# Patient Record
Sex: MALE | Race: ASIAN | NOT HISPANIC OR LATINO
[De-identification: names, ages, dates, MRNs, and addresses within clinical notes are randomized per-mention and may not be internally consistent; named-entity substitution may affect disease eponyms.]

---

## 2024-05-13 PROBLEM — Z00.00 ENCOUNTER FOR PREVENTIVE HEALTH EXAMINATION: Status: ACTIVE | Noted: 2024-05-13

## 2024-05-16 ENCOUNTER — APPOINTMENT (OUTPATIENT)
Dept: THORACIC SURGERY | Facility: CLINIC | Age: 59
End: 2024-05-16
Payer: MEDICAID

## 2024-05-16 VITALS
WEIGHT: 163 LBS | HEART RATE: 98 BPM | OXYGEN SATURATION: 96 % | TEMPERATURE: 98.3 F | HEIGHT: 67.72 IN | RESPIRATION RATE: 18 BRPM | DIASTOLIC BLOOD PRESSURE: 76 MMHG | SYSTOLIC BLOOD PRESSURE: 117 MMHG | BODY MASS INDEX: 24.99 KG/M2

## 2024-05-16 DIAGNOSIS — E13.69 OTHER SPECIFIED DIABETES MELLITUS WITH OTHER SPECIFIED COMPLICATION: ICD-10-CM

## 2024-05-16 DIAGNOSIS — R59.1 GENERALIZED ENLARGED LYMPH NODES: ICD-10-CM

## 2024-05-16 DIAGNOSIS — Z78.9 OTHER SPECIFIED HEALTH STATUS: ICD-10-CM

## 2024-05-16 DIAGNOSIS — J18.1 LOBAR PNEUMONIA, UNSPECIFIED ORGANISM: ICD-10-CM

## 2024-05-16 DIAGNOSIS — Z86.39 PERSONAL HISTORY OF OTHER ENDOCRINE, NUTRITIONAL AND METABOLIC DISEASE: ICD-10-CM

## 2024-05-16 DIAGNOSIS — F17.200 NICOTINE DEPENDENCE, UNSPECIFIED, UNCOMPLICATED: ICD-10-CM

## 2024-05-16 PROCEDURE — 99245 OFF/OP CONSLTJ NEW/EST HI 55: CPT

## 2024-05-20 PROBLEM — R59.1 LYMPHADENOPATHY: Status: ACTIVE | Noted: 2024-05-20

## 2024-05-20 NOTE — ASSESSMENT
[FreeTextEntry1] : Mr. RAJIV BARKER, 58 year old male, current smoker, w/ hx of DM, HLD, who c/o cough, sent for CT chest which showed lung consolidation, patient was given ~ 2 course of ABX, also developed hemoptysis for 10 days, referred by PCP Dr. Carter Green.  CT Chest w/ contrast on 4/5/24 at [facility]: - a large area of consolidation in the right lower lobe measuring up to 10 cm. - There is a more focal 2.6 cm masslike density in the medial aspect of the right lower lobe series 2 image 58. A close follow up CT is recommended in 3-4 weeks after treatment to assess for resolution and exclude the possibility of malignancy - There are mild emphysematous changes. - No evidence of pleural effusion or pleural-based mass.  CT Chest w/ contrast on 5/5/24 at [facility]: - The lungs demonstrate a right lower lobe pulmonary nodular opacity measuring up to 2.9 x 2.6 cm in maximal dimension on axial imaging (series 3, image 191) that has not significantly changed since April 5, 2024; please note that there is increasing associated right basilar pulmonary opacification (series 3, image 240).  - There is persistent minimal pulmonary emphysema with an upper lung distribution.  - endoluminal secretions within the central airways, including with occlusion of parts of the right lower lobe bronchial system (series 3, image 184).  - There is diffuse bronchial and bronchiolar wall thickening.  - There is no pleural effusion or pneumothorax on either side - diffuse fatty infiltration of the imaged liver (hepatic steatosis) in addition to a 2 mm hepatic calcification that may be related to prior granulomatous disease (series 2, image 88).  I have reviewed the patient's medical records and diagnostic images at time of this office consultation and have made the following recommendation: 1. CT chest reviewed and compared, not significantly changed after treated with ABX. We discussed biopsy for diagnostic purposes. Will schedule for Flexible Bronchoscopy, Endobronchial ultrasound biopsy on 06/06/2024. The risks benefits and alternatives of the procedure were explained to the patient. All of his questions were answered, and patient freely consented to the procedure.  2. Medical clearance and PST. 3. PET/CT 4. Smoking cessation.   *****Patient did not provide medication list****  I, Dr. MARLYN, MARYBETH CHINFAI, personally performed the evaluation and management (E/M) services for this new patient.  That E/M includes conducting the initial examination, assessing all conditions, and establishing the plan of care.  Today, my ACP, Sveta Vciente ANP-C, was here to observe my evaluation and management services for this patient to be followed going forward.

## 2024-05-20 NOTE — DATA REVIEWED
[FreeTextEntry1] : I have independently reviewed the following: CT Chest w/ contrast on 4/5/24 at [facility] CT Chest w/ contrast on 5/5/24 at [facility]

## 2024-05-20 NOTE — HISTORY OF PRESENT ILLNESS
[FreeTextEntry1] : Mr. RAJIV BARKER, 58 year old male, current smoker, w/ hx of DM, HLD, who c/o cough, sent for CT chest which showed lung consolidation, patient was given ~ 2 course of ABX, also developed hemoptysis for 10 days, referred by PCP Dr. Carter Green.  CT Chest w/ contrast on 4/5/24 at [facility]: - a large area of consolidation in the right lower lobe measuring up to 10 cm. - There is a more focal 2.6 cm masslike density in the medial aspect of the right lower lobe series 2 image 58. A close follow up CT is recommended in 3-4 weeks after treatment to assess for resolution and exclude the possibility of malignancy - There are mild emphysematous changes. - No evidence of pleural effusion or pleural-based mass.  CT Chest w/ contrast on 5/5/24 at [facility]: - The lungs demonstrate a right lower lobe pulmonary nodular opacity measuring up to 2.9 x 2.6 cm in maximal dimension on axial imaging (series 3, image 191) that has not significantly changed since April 5, 2024; please note that there is increasing associated right basilar pulmonary opacification (series 3, image 240).  - There is persistent minimal pulmonary emphysema with an upper lung distribution.  - endoluminal secretions within the central airways, including with occlusion of parts of the right lower lobe bronchial system (series 3, image 184).  - There is diffuse bronchial and bronchiolar wall thickening.  - There is no pleural effusion or pneumothorax on either side - diffuse fatty infiltration of the imaged liver (hepatic steatosis) in addition to a 2 mm hepatic calcification that may be related to prior granulomatous disease (series 2, image 88).  Patient is here today for CT Sx consultation. Patient c/o SOB, chills, cough with whitish sputum, no hemoptysis now. ~ 25 lbs weight loss since 10/2023. Patient denies chest pain, fever, chills, loss of appetite, or hemoptysis.

## 2024-05-20 NOTE — CONSULT LETTER
[Dear  ___] : Dear  [unfilled], [Consult Letter:] : I had the pleasure of evaluating your patient, [unfilled]. [( Thank you for referring [unfilled] for consultation for _____ )] : Thank you for referring [unfilled] for consultation for [unfilled] [Please see my note below.] : Please see my note below. [Consult Closing:] : Thank you very much for allowing me to participate in the care of this patient.  If you have any questions, please do not hesitate to contact me. [Sincerely,] : Sincerely, [FreeTextEntry2] : Dr. Carter Green (PCP/Referring) [FreeTextEntry3] : Herberth Darling MD, MPH  System Director of Thoracic Surgery  Director of Comprehensive Lung and Foregut [place]  Professor Cardiovascular & Thoracic Surgery   [facility] [employer] [facility] [address] [place] [address] [place] [address] Tel: [phone] Fax: [phone]

## 2024-05-31 ENCOUNTER — OUTPATIENT (OUTPATIENT)
Dept: OUTPATIENT SERVICES | Facility: HOSPITAL | Age: 59
LOS: 1 days | End: 2024-05-31

## 2024-05-31 VITALS
TEMPERATURE: 98 F | OXYGEN SATURATION: 100 % | WEIGHT: 149.91 LBS | HEART RATE: 87 BPM | SYSTOLIC BLOOD PRESSURE: 124 MMHG | RESPIRATION RATE: 16 BRPM | DIASTOLIC BLOOD PRESSURE: 77 MMHG | HEIGHT: 69 IN

## 2024-05-31 DIAGNOSIS — I10 ESSENTIAL (PRIMARY) HYPERTENSION: ICD-10-CM

## 2024-05-31 DIAGNOSIS — E11.9 TYPE 2 DIABETES MELLITUS WITHOUT COMPLICATIONS: ICD-10-CM

## 2024-05-31 DIAGNOSIS — J18.1 LOBAR PNEUMONIA, UNSPECIFIED ORGANISM: ICD-10-CM

## 2024-05-31 DIAGNOSIS — Z98.890 OTHER SPECIFIED POSTPROCEDURAL STATES: Chronic | ICD-10-CM

## 2024-05-31 RX ORDER — DEXTROSE MONOHYDRATE AND SODIUM CHLORIDE 5; .3 G/100ML; G/100ML
1000 INJECTION, SOLUTION INTRAVENOUS
Refills: 0 | Status: DISCONTINUED | OUTPATIENT
Start: 2024-06-20 | End: 2024-07-04

## 2024-05-31 NOTE — H&P PST ADULT - PROBLEM SELECTOR PLAN 1
pt scheduled for flexible bronchoscopy endobronchial ultrasound guided biopsy **cytology needed***  Preop instructions provided. Pt verbalized understanding.   Pepcid for GI prophylaxis with written and verbal instruction provided  recent labs from PCP requested

## 2024-05-31 NOTE — H&P PST ADULT - PROBLEM SELECTOR PLAN 2
pt instructed to continue medications as prescribed and take cozaar with a sip of water on the morning of the surgery

## 2024-05-31 NOTE — H&P PST ADULT - PROBLEM SELECTOR PLAN 3
Patient instructed to hold actos, januvia, metformin on the morning of procedure. Pt stated understanding.

## 2024-05-31 NOTE — H&P PST ADULT - NSANTHOSAYNRD_GEN_A_CORE
No. VERONIKA screening performed.  STOP BANG Legend: 0-2 = LOW Risk; 3-4 = INTERMEDIATE Risk; 5-8 = HIGH Risk

## 2024-05-31 NOTE — H&P PST ADULT - NSICDXPASTMEDICALHX_GEN_ALL_CORE_FT
PAST MEDICAL HISTORY:  DM (diabetes mellitus)     HLD (hyperlipidemia)     HTN (hypertension)     Kidney stone     Lobar pneumonia, unspecified organism

## 2024-05-31 NOTE — H&P PST ADULT - HISTORY OF PRESENT ILLNESS
59 y.o. male with h/o HTN, HLD, DM, presents to [facility] for evaluation scheduled for flexible bronchoscopy endobronchial ultrasound guided biopsy ***cytology needed***, preop diagnosis lobar pneumonia unspecified organism, c/o productive cough x 6 months with yellowish sputum, treated with abx, reports hemoptysis 1 month ago, weight loss x 25 lbs since 10/2023, denies further episodes of hemoptysis since, denies SOB, fever, chills, s/p CT chest 04/2024 and 05/2025, right lower lobe opacity noted

## 2024-05-31 NOTE — H&P PST ADULT - LAST ECHOCARDIOGRAM
05/2024, "in [place]", pt unable to provide further information, states was referred by PCP for the test 05/2024, "in [place]", [phone], pt unable to provide further information

## 2024-06-19 ENCOUNTER — TRANSCRIPTION ENCOUNTER (OUTPATIENT)
Age: 59
End: 2024-06-19

## 2024-06-20 ENCOUNTER — RESULT REVIEW (OUTPATIENT)
Age: 59
End: 2024-06-20

## 2024-06-20 ENCOUNTER — OUTPATIENT (OUTPATIENT)
Dept: OUTPATIENT SERVICES | Facility: HOSPITAL | Age: 59
LOS: 1 days | Discharge: HOME | End: 2024-06-20
Payer: MEDICAID

## 2024-06-20 ENCOUNTER — TRANSCRIPTION ENCOUNTER (OUTPATIENT)
Age: 59
End: 2024-06-20

## 2024-06-20 ENCOUNTER — APPOINTMENT (OUTPATIENT)
Dept: THORACIC SURGERY | Facility: HOSPITAL | Age: 59
End: 2024-06-20

## 2024-06-20 VITALS
RESPIRATION RATE: 16 BRPM | OXYGEN SATURATION: 100 % | SYSTOLIC BLOOD PRESSURE: 108 MMHG | HEART RATE: 67 BPM | TEMPERATURE: 98 F | DIASTOLIC BLOOD PRESSURE: 65 MMHG

## 2024-06-20 VITALS
OXYGEN SATURATION: 100 % | HEIGHT: 69 IN | RESPIRATION RATE: 16 BRPM | HEART RATE: 63 BPM | SYSTOLIC BLOOD PRESSURE: 117 MMHG | TEMPERATURE: 98 F | DIASTOLIC BLOOD PRESSURE: 70 MMHG | WEIGHT: 149.91 LBS

## 2024-06-20 DIAGNOSIS — Z98.890 OTHER SPECIFIED POSTPROCEDURAL STATES: Chronic | ICD-10-CM

## 2024-06-20 DIAGNOSIS — J18.1 LOBAR PNEUMONIA, UNSPECIFIED ORGANISM: ICD-10-CM

## 2024-06-20 LAB
GLUCOSE BLDC GLUCOMTR-MCNC: 143 MG/DL — HIGH (ref 70–99)
GLUCOSE BLDC GLUCOMTR-MCNC: 162 MG/DL — HIGH (ref 70–99)

## 2024-06-20 PROCEDURE — 88112 CYTOPATH CELL ENHANCE TECH: CPT | Mod: 26,59

## 2024-06-20 PROCEDURE — 31633 BRONCHOSCOPY/NEEDLE BX ADDL: CPT

## 2024-06-20 PROCEDURE — 88173 CYTOPATH EVAL FNA REPORT: CPT | Mod: 26

## 2024-06-20 PROCEDURE — 88342 IMHCHEM/IMCYTCHM 1ST ANTB: CPT | Mod: 26

## 2024-06-20 PROCEDURE — 31629 BRONCHOSCOPY/NEEDLE BX EACH: CPT

## 2024-06-20 PROCEDURE — 88305 TISSUE EXAM BY PATHOLOGIST: CPT | Mod: 26

## 2024-06-20 PROCEDURE — 31624 DX BRONCHOSCOPE/LAVAGE: CPT

## 2024-06-20 PROCEDURE — 31652 BRONCH EBUS SAMPLNG 1/2 NODE: CPT

## 2024-06-20 PROCEDURE — 88341 IMHCHEM/IMCYTCHM EA ADD ANTB: CPT | Mod: 26

## 2024-06-20 PROCEDURE — 88172 CYTP DX EVAL FNA 1ST EA SITE: CPT | Mod: 26,59

## 2024-06-20 PROCEDURE — 88333 PATH CONSLTJ SURG CYTO XM 1: CPT | Mod: 26,59

## 2024-06-20 PROCEDURE — 31625 BRONCHOSCOPY W/BIOPSY(S): CPT | Mod: 59

## 2024-06-20 PROCEDURE — 31645 BRNCHSC W/THER ASPIR 1ST: CPT

## 2024-06-20 PROCEDURE — 71045 X-RAY EXAM CHEST 1 VIEW: CPT | Mod: 26

## 2024-06-20 PROCEDURE — 88334 PATH CONSLTJ SURG CYTO XM EA: CPT | Mod: 26,59

## 2024-06-20 RX ORDER — FENTANYL CITRATE 50 UG/ML
25 INJECTION, SOLUTION INTRAMUSCULAR; INTRAVENOUS
Refills: 0 | Status: DISCONTINUED | OUTPATIENT
Start: 2024-06-20 | End: 2024-06-20

## 2024-06-20 RX ORDER — SITAGLIPTIN 50 MG/1
1 TABLET, FILM COATED ORAL
Refills: 0 | DISCHARGE

## 2024-06-20 RX ORDER — PYRIDOXINE HCL (VITAMIN B6) 100 MG
0 TABLET ORAL
Refills: 0 | DISCHARGE

## 2024-06-20 RX ORDER — ACETAMINOPHEN 325 MG
1000 TABLET ORAL ONCE
Refills: 0 | Status: DISCONTINUED | OUTPATIENT
Start: 2024-06-20 | End: 2024-07-04

## 2024-06-20 RX ORDER — FOLIC ACID 0.8 MG
1 TABLET ORAL
Refills: 0 | DISCHARGE

## 2024-06-20 RX ORDER — PIOGLITAZONE HYDROCHLORIDE 15 MG/1
1 TABLET ORAL
Refills: 0 | DISCHARGE

## 2024-06-20 RX ORDER — LOSARTAN POTASSIUM 100 MG/1
1 TABLET, FILM COATED ORAL
Refills: 0 | DISCHARGE

## 2024-06-20 RX ORDER — ONDANSETRON HYDROCHLORIDE 2 MG/ML
4 INJECTION INTRAMUSCULAR; INTRAVENOUS ONCE
Refills: 0 | Status: DISCONTINUED | OUTPATIENT
Start: 2024-06-20 | End: 2024-07-04

## 2024-06-20 RX ORDER — ATORVASTATIN CALCIUM 80 MG/1
1 TABLET, FILM COATED ORAL
Refills: 0 | DISCHARGE

## 2024-06-20 RX ORDER — ERGOCALCIFEROL 1.25 MG/1
0 CAPSULE ORAL
Refills: 0 | DISCHARGE

## 2024-06-20 RX ORDER — METFORMIN HYDROCHLORIDE 850 MG/1
1 TABLET ORAL
Refills: 0 | DISCHARGE

## 2024-06-20 RX ORDER — ALBUTEROL 90 MCG
2.5 AEROSOL REFILL (GRAM) INHALATION ONCE
Refills: 0 | Status: DISCONTINUED | OUTPATIENT
Start: 2024-06-20 | End: 2024-07-04

## 2024-06-20 RX ORDER — PREGABALIN 225 MG/1
0 CAPSULE ORAL
Refills: 0 | DISCHARGE

## 2024-06-25 PROBLEM — E78.5 HYPERLIPIDEMIA, UNSPECIFIED: Chronic | Status: ACTIVE | Noted: 2024-05-31

## 2024-06-25 PROBLEM — N20.0 CALCULUS OF KIDNEY: Chronic | Status: ACTIVE | Noted: 2024-05-31

## 2024-06-25 PROBLEM — I10 ESSENTIAL (PRIMARY) HYPERTENSION: Chronic | Status: ACTIVE | Noted: 2024-05-31

## 2024-06-25 PROBLEM — E11.9 TYPE 2 DIABETES MELLITUS WITHOUT COMPLICATIONS: Chronic | Status: ACTIVE | Noted: 2024-05-31

## 2024-06-25 PROBLEM — J18.1 LOBAR PNEUMONIA, UNSPECIFIED ORGANISM: Chronic | Status: ACTIVE | Noted: 2024-05-31

## 2024-07-04 LAB — NON-GYNECOLOGICAL CYTOLOGY STUDY: SIGNIFICANT CHANGE UP

## 2024-07-09 PROBLEM — C77.9 METASTATIC SQUAMOUS CELL CARCINOMA TO LYMPH NODE: Status: ACTIVE | Noted: 2024-07-08

## 2024-07-11 ENCOUNTER — NON-APPOINTMENT (OUTPATIENT)
Age: 59
End: 2024-07-11

## 2024-07-11 ENCOUNTER — APPOINTMENT (OUTPATIENT)
Dept: THORACIC SURGERY | Facility: CLINIC | Age: 59
End: 2024-07-11
Payer: MEDICAID

## 2024-07-11 VITALS
DIASTOLIC BLOOD PRESSURE: 61 MMHG | RESPIRATION RATE: 18 BRPM | TEMPERATURE: 98.1 F | SYSTOLIC BLOOD PRESSURE: 124 MMHG | OXYGEN SATURATION: 95 % | WEIGHT: 163 LBS | HEART RATE: 63 BPM | BODY MASS INDEX: 24.99 KG/M2

## 2024-07-11 DIAGNOSIS — C77.9 SECONDARY AND UNSPECIFIED MALIGNANT NEOPLASM OF LYMPH NODE, UNSPECIFIED: ICD-10-CM

## 2024-07-11 PROCEDURE — 99213 OFFICE O/P EST LOW 20 MIN: CPT

## 2024-10-10 ENCOUNTER — APPOINTMENT (OUTPATIENT)
Dept: THORACIC SURGERY | Facility: CLINIC | Age: 59
End: 2024-10-10
Payer: MEDICAID

## 2024-10-10 VITALS
DIASTOLIC BLOOD PRESSURE: 75 MMHG | TEMPERATURE: 98.1 F | RESPIRATION RATE: 18 BRPM | WEIGHT: 165 LBS | OXYGEN SATURATION: 100 % | SYSTOLIC BLOOD PRESSURE: 118 MMHG | BODY MASS INDEX: 25.3 KG/M2 | HEART RATE: 78 BPM

## 2024-10-10 DIAGNOSIS — C77.9 SECONDARY AND UNSPECIFIED MALIGNANT NEOPLASM OF LYMPH NODE, UNSPECIFIED: ICD-10-CM

## 2024-10-10 PROCEDURE — 99214 OFFICE O/P EST MOD 30 MIN: CPT

## 2024-10-31 ENCOUNTER — APPOINTMENT (OUTPATIENT)
Dept: THORACIC SURGERY | Facility: CLINIC | Age: 59
End: 2024-10-31

## 2024-11-04 ENCOUNTER — NON-APPOINTMENT (OUTPATIENT)
Age: 59
End: 2024-11-04

## 2024-11-06 ENCOUNTER — APPOINTMENT (OUTPATIENT)
Dept: THORACIC SURGERY | Facility: HOSPITAL | Age: 59
End: 2024-11-06

## 2024-11-22 ENCOUNTER — NON-APPOINTMENT (OUTPATIENT)
Age: 59
End: 2024-11-22

## 2024-11-22 ENCOUNTER — APPOINTMENT (OUTPATIENT)
Dept: INTERVENTIONAL RADIOLOGY/VASCULAR | Facility: CLINIC | Age: 59
End: 2024-11-22
Payer: MEDICAID

## 2024-11-22 VITALS — WEIGHT: 165 LBS | HEIGHT: 69 IN | BODY MASS INDEX: 24.44 KG/M2

## 2024-11-22 DIAGNOSIS — E27.8 OTHER SPECIFIED DISORDERS OF ADRENAL GLAND: ICD-10-CM

## 2024-11-22 PROCEDURE — 99244 OFF/OP CNSLTJ NEW/EST MOD 40: CPT

## 2024-11-22 PROCEDURE — T1013A: CUSTOM

## 2024-11-22 RX ORDER — SITAGLIPTIN 100 MG/1
100 TABLET, FILM COATED ORAL
Refills: 0 | Status: ACTIVE | COMMUNITY

## 2024-11-22 RX ORDER — FOLIC ACID 1 MG/1
1 TABLET ORAL
Refills: 0 | Status: ACTIVE | COMMUNITY

## 2024-11-22 RX ORDER — LOSARTAN POTASSIUM 100 MG/1
TABLET, FILM COATED ORAL
Refills: 0 | Status: ACTIVE | COMMUNITY

## 2024-11-22 RX ORDER — ATORVASTATIN CALCIUM 40 MG/1
40 TABLET, FILM COATED ORAL
Refills: 0 | Status: ACTIVE | COMMUNITY

## 2024-11-22 RX ORDER — METFORMIN HYDROCHLORIDE 1000 MG/1
1000 TABLET, COATED ORAL
Refills: 0 | Status: ACTIVE | COMMUNITY

## 2024-11-22 RX ORDER — PIOGLITAZONE 45 MG/1
45 TABLET ORAL
Refills: 0 | Status: ACTIVE | COMMUNITY

## 2024-11-29 ENCOUNTER — OUTPATIENT (OUTPATIENT)
Dept: OUTPATIENT SERVICES | Facility: HOSPITAL | Age: 59
LOS: 1 days | End: 2024-11-29
Payer: MEDICAID

## 2024-11-29 ENCOUNTER — RESULT REVIEW (OUTPATIENT)
Age: 59
End: 2024-11-29

## 2024-11-29 VITALS
DIASTOLIC BLOOD PRESSURE: 86 MMHG | HEART RATE: 61 BPM | OXYGEN SATURATION: 99 % | RESPIRATION RATE: 16 BRPM | SYSTOLIC BLOOD PRESSURE: 141 MMHG

## 2024-11-29 VITALS
TEMPERATURE: 98 F | RESPIRATION RATE: 14 BRPM | HEART RATE: 57 BPM | SYSTOLIC BLOOD PRESSURE: 124 MMHG | OXYGEN SATURATION: 98 % | DIASTOLIC BLOOD PRESSURE: 72 MMHG

## 2024-11-29 DIAGNOSIS — Z98.890 OTHER SPECIFIED POSTPROCEDURAL STATES: Chronic | ICD-10-CM

## 2024-11-29 DIAGNOSIS — E27.9 DISORDER OF ADRENAL GLAND, UNSPECIFIED: ICD-10-CM

## 2024-11-29 LAB — GLUCOSE BLDC GLUCOMTR-MCNC: 114 MG/DL — HIGH (ref 70–99)

## 2024-11-29 PROCEDURE — 77012 CT SCAN FOR NEEDLE BIOPSY: CPT | Mod: 26

## 2024-11-29 PROCEDURE — 88305 TISSUE EXAM BY PATHOLOGIST: CPT | Mod: 26

## 2024-11-29 PROCEDURE — 88333 PATH CONSLTJ SURG CYTO XM 1: CPT | Mod: 26

## 2024-11-29 PROCEDURE — 49180 BIOPSY ABDOMINAL MASS: CPT

## 2024-11-29 RX ORDER — ONDANSETRON HYDROCHLORIDE 4 MG/1
4 TABLET, FILM COATED ORAL ONCE
Refills: 0 | Status: ACTIVE | OUTPATIENT
Start: 2024-11-29 | End: 2025-10-28

## 2024-11-29 RX ORDER — FENTANYL 12 UG/H
50 PATCH, EXTENDED RELEASE TRANSDERMAL
Refills: 0 | Status: DISCONTINUED | OUTPATIENT
Start: 2024-11-29 | End: 2024-11-29

## 2024-11-29 NOTE — PRE PROCEDURE NOTE - HISTORY OF PRESENT ILLNESS
Interventional Radiology  Pre-Procedure Note    This is a 59y  Male  presenting for right adrenal biopsy    HPI:  59 year old male with a past medical history of current smoker, DM, HLD, who c/o cough, sent for CT chest which showed lung consolidation, patient was given ~ 2 course of ABX, also developed hemoptysis for 10 days. Patient has domenico evaluated by Dr. Darling CTSX and was scheduled for Right VATs, RA, RLLobectomy. PET/CT was completed and a new adrenal lesion was noted. Patients lung surgery is now canceled and he has been referred to IR by Dr. Darling for consultation regarding an adrenal nodule biopsy.      PAST MEDICAL & SURGICAL HISTORY:  HLD (hyperlipidemia)      DM (diabetes mellitus)      Lobar pneumonia, unspecified organism      Kidney stone      HTN (hypertension)      H/O shoulder surgery      H/O knee surgery      H/O lithotripsy          Social History:     FAMILY HISTORY:  Family history of diabetes mellitus (DM) (Mother)        Allergies: No Known Allergies      Current Medications:     Labs:   from [facility] 11/27/24  H/H 11/34      INR 0.9      Assessment/Plan:   This is a 59y Male  presents with right adrenal nodule  Patient presents to IR for biopsy.  Procedure/ risks/ benefits/ goals/ alternatives were explained. All questions answered. Informed content obtained from patient. Consent placed in chart.

## 2024-12-02 LAB — NON-GYNECOLOGICAL CYTOLOGY STUDY: SIGNIFICANT CHANGE UP

## 2024-12-10 DIAGNOSIS — E27.8 OTHER SPECIFIED DISORDERS OF ADRENAL GLAND: ICD-10-CM

## 2024-12-10 DIAGNOSIS — R91.8 OTHER NONSPECIFIC ABNORMAL FINDING OF LUNG FIELD: ICD-10-CM

## 2024-12-19 ENCOUNTER — APPOINTMENT (OUTPATIENT)
Dept: THORACIC SURGERY | Facility: CLINIC | Age: 59
End: 2024-12-19
Payer: MEDICAID

## 2024-12-19 VITALS
OXYGEN SATURATION: 96 % | TEMPERATURE: 98.1 F | DIASTOLIC BLOOD PRESSURE: 78 MMHG | BODY MASS INDEX: 23.92 KG/M2 | RESPIRATION RATE: 18 BRPM | WEIGHT: 162 LBS | SYSTOLIC BLOOD PRESSURE: 132 MMHG | HEART RATE: 88 BPM

## 2024-12-19 DIAGNOSIS — E27.8 OTHER SPECIFIED DISORDERS OF ADRENAL GLAND: ICD-10-CM

## 2024-12-19 DIAGNOSIS — C77.9 SECONDARY AND UNSPECIFIED MALIGNANT NEOPLASM OF LYMPH NODE, UNSPECIFIED: ICD-10-CM

## 2024-12-19 PROCEDURE — 99213 OFFICE O/P EST LOW 20 MIN: CPT

## 2024-12-25 PROBLEM — F10.90 ALCOHOL USE: Status: ACTIVE | Noted: 2024-05-16

## 2025-01-23 ENCOUNTER — OUTPATIENT (OUTPATIENT)
Dept: OUTPATIENT SERVICES | Facility: HOSPITAL | Age: 60
LOS: 1 days | End: 2025-01-23
Payer: MEDICAID

## 2025-01-23 DIAGNOSIS — Z98.890 OTHER SPECIFIED POSTPROCEDURAL STATES: Chronic | ICD-10-CM

## 2025-01-23 DIAGNOSIS — D69.6 THROMBOCYTOPENIA, UNSPECIFIED: ICD-10-CM

## 2025-01-23 DIAGNOSIS — C34.91 MALIGNANT NEOPLASM OF UNSPECIFIED PART OF RIGHT BRONCHUS OR LUNG: ICD-10-CM

## 2025-01-23 LAB
ABO RH CONFIRMATION: SIGNIFICANT CHANGE UP
BLD GP AB SCN SERPL QL: SIGNIFICANT CHANGE UP

## 2025-01-24 PROCEDURE — 86850 RBC ANTIBODY SCREEN: CPT

## 2025-01-24 PROCEDURE — 86900 BLOOD TYPING SEROLOGIC ABO: CPT

## 2025-01-24 PROCEDURE — 36430 TRANSFUSION BLD/BLD COMPNT: CPT

## 2025-01-24 PROCEDURE — 36415 COLL VENOUS BLD VENIPUNCTURE: CPT

## 2025-01-24 PROCEDURE — P9100: CPT

## 2025-01-24 PROCEDURE — 86901 BLOOD TYPING SEROLOGIC RH(D): CPT

## 2025-01-24 PROCEDURE — P9037: CPT

## 2025-04-22 ENCOUNTER — OUTPATIENT (OUTPATIENT)
Dept: OUTPATIENT SERVICES | Facility: HOSPITAL | Age: 60
LOS: 1 days | End: 2025-04-22
Payer: MEDICAID

## 2025-04-22 DIAGNOSIS — C34.91 MALIGNANT NEOPLASM OF UNSPECIFIED PART OF RIGHT BRONCHUS OR LUNG: ICD-10-CM

## 2025-04-22 DIAGNOSIS — Z98.890 OTHER SPECIFIED POSTPROCEDURAL STATES: Chronic | ICD-10-CM

## 2025-04-22 LAB — BLD GP AB SCN SERPL QL: SIGNIFICANT CHANGE UP

## 2025-04-24 PROCEDURE — P9040: CPT

## 2025-04-24 PROCEDURE — 86900 BLOOD TYPING SEROLOGIC ABO: CPT

## 2025-04-24 PROCEDURE — 86923 COMPATIBILITY TEST ELECTRIC: CPT

## 2025-04-24 PROCEDURE — 36415 COLL VENOUS BLD VENIPUNCTURE: CPT

## 2025-04-24 PROCEDURE — 86901 BLOOD TYPING SEROLOGIC RH(D): CPT

## 2025-04-24 PROCEDURE — 86850 RBC ANTIBODY SCREEN: CPT

## (undated) DEVICE — VALVE SUCTION EVIS 160/200/240

## (undated) DEVICE — ADAPTER FIBEROPTIC BRONCHOSCOPE DUAL AXIS SWIVEL

## (undated) DEVICE — GLV 8 PROTEXIS (WHITE)

## (undated) DEVICE — POSITIONER STRAP ARMBOARD VELCRO TS-30

## (undated) DEVICE — POSITIONER FOAM EGG CRATE ULNAR 2PCS (PINK)

## (undated) DEVICE — SOL ANTI FOG

## (undated) DEVICE — VALVE BIOPSY BRONCHOVIDEOSCOPE

## (undated) DEVICE — VENODYNE/SCD SLEEVE CALF MEDIUM

## (undated) DEVICE — DRSG CURITY GAUZE SPONGE 4 X 4" 12-PLY

## (undated) DEVICE — DRSG TELFA 3 X 8

## (undated) DEVICE — DRAPE 3/4 SHEET 52X76"

## (undated) DEVICE — CHEST DRAIN PLEUR-EVAC DRY/WET ADULT-PEDS SINGLE (QUICK)

## (undated) DEVICE — SOL INJ LR 1000ML

## (undated) DEVICE — TUBING SUCTION NONCONDUCTIVE 6MM X 12FT

## (undated) DEVICE — NDL ASPIRATION 22G W SYR

## (undated) DEVICE — SOL IRR POUR NS 0.9% 500ML

## (undated) DEVICE — WARMING BLANKET LOWER ADULT

## (undated) DEVICE — SYR SLIP 10CC

## (undated) DEVICE — BALLOON SINGLE FOR BF-UC160F